# Patient Record
Sex: MALE | Race: BLACK OR AFRICAN AMERICAN | URBAN - METROPOLITAN AREA
[De-identification: names, ages, dates, MRNs, and addresses within clinical notes are randomized per-mention and may not be internally consistent; named-entity substitution may affect disease eponyms.]

---

## 2023-10-26 ENCOUNTER — EMERGENCY (EMERGENCY)
Facility: HOSPITAL | Age: 33
LOS: 1 days | Discharge: HOME | End: 2023-10-26
Attending: EMERGENCY MEDICINE | Admitting: EMERGENCY MEDICINE
Payer: SELF-PAY

## 2023-10-26 VITALS
SYSTOLIC BLOOD PRESSURE: 107 MMHG | OXYGEN SATURATION: 97 % | DIASTOLIC BLOOD PRESSURE: 58 MMHG | TEMPERATURE: 98 F | WEIGHT: 164.91 LBS | RESPIRATION RATE: 16 BRPM | HEART RATE: 69 BPM

## 2023-10-26 VITALS
SYSTOLIC BLOOD PRESSURE: 121 MMHG | TEMPERATURE: 98 F | RESPIRATION RATE: 18 BRPM | HEART RATE: 67 BPM | DIASTOLIC BLOOD PRESSURE: 89 MMHG | OXYGEN SATURATION: 99 %

## 2023-10-26 PROCEDURE — 99283 EMERGENCY DEPT VISIT LOW MDM: CPT

## 2023-10-26 NOTE — ED PROVIDER NOTE - PHYSICAL EXAMINATION
CONST: Appears comfortable in the bed  ENT: Airway patent, protecting airway. Nasal mucosa clear. No signs of trauma to head, face or neck.   EYES: Sclera clear. Pupils round and symmetrical bilaterally.  CARD: S1, S2 normal; no murmurs, gallops, or rubs. Regular rate and rhythm.  RESP: Breath sounds clear and equal bilaterally.  GI: Abdomen soft, non-tender, non-distended  MSK: No signs of acute trauma or injury to all extremities.  No tenderness to cervical spine to palpation.  NEURO: Speech is slurred but appropriate. Answers simple questions appropriately. Moves all extremities.   SKIN: Skin is normal temperature; no diaphoresis; no pallor. No signs of acute trauma or injury.   PSYCH: Clinically intoxicated and cooperative, able to be redirected verbally.

## 2023-10-26 NOTE — ED PROVIDER NOTE - OBJECTIVE STATEMENT
32 y/o M BIBA for AMS. Admits to K2 use today, no other complaints.  Placed in stretcher and quickly falls asleep.  Unable to cooperate with remainder of history due to clinical condition/AMS.

## 2023-10-26 NOTE — ED PROVIDER NOTE - CLINICAL SUMMARY MEDICAL DECISION MAKING FREE TEXT BOX
Patient presenting with acute AMS, admits to K2 use. No trauma or injuries noted. History and ROS limited due to altered state.  No evidence of head or extremity trauma. Will observe for clinical sobriety.

## 2023-10-26 NOTE — ED ADULT TRIAGE NOTE - CHIEF COMPLAINT QUOTE
Bystanders called EMS after pt was  on side walk, on arrival  pt sleeping but easy to arouse. Pt admits to using K2 and cocaine

## 2023-10-26 NOTE — ED PROVIDER NOTE - UNABLE TO OBTAIN
Severe Illness/Injury Unable to fully cooperate with remainder of history due to clinical condition/AMS.

## 2023-10-26 NOTE — ED ADULT NURSE NOTE - OBJECTIVE STATEMENT
Pt presented AMS, sleeping but easily arousable. Reported sleeping on sidewalk reported cocain useage, and K2 useage. Pt not answering RN assesement questions. VSS

## 2023-10-26 NOTE — ED PROVIDER NOTE - PATIENT PORTAL LINK FT
You can access the FollowMyHealth Patient Portal offered by [facility] by registering at the following website: [email or web address] By joining [email or web address]’s FollowMyHealth portal, you will also be able to view your health information using other applications (apps) compatible with our system.

## 2023-10-26 NOTE — ED ADULT NURSE REASSESSMENT NOTE - NS ED NURSE REASSESS COMMENT FT1
Pt dc with all educastion given. All paperwork given to patient and pt given shelter information as well as a sandwitch. VSS and patient reported feelinbg better. Self ambulated out of ed with all belonigns and paperworl

## 2023-10-26 NOTE — ED PROVIDER NOTE - NS ED ROS FT
Other than symptoms associated with present events the following is reported:  CONST: Suspected intoxication per EMS.  MSK: No reported trauma per EMS.   GI: No reported vomiting.   NEURO: Suspected intoxication per EMS.  SKIN: No reported trauma per EMS.

## 2023-10-28 DIAGNOSIS — F12.929 CANNABIS USE, UNSPECIFIED WITH INTOXICATION, UNSPECIFIED: ICD-10-CM

## 2023-10-28 DIAGNOSIS — F19.19 OTHER PSYCHOACTIVE SUBSTANCE ABUSE WITH UNSPECIFIED PSYCHOACTIVE SUBSTANCE-INDUCED DISORDER: ICD-10-CM

## 2023-10-28 DIAGNOSIS — R41.82 ALTERED MENTAL STATUS, UNSPECIFIED: ICD-10-CM
